# Patient Record
(demographics unavailable — no encounter records)

---

## 2024-11-26 NOTE — CONSULT LETTER
[Dear  ___] : Dear  [unfilled], [Consult Letter:] : I had the pleasure of evaluating your patient, [unfilled]. [Please see my note below.] : Please see my note below. [Consult Closing:] : Thank you very much for allowing me to participate in the care of this patient.  If you have any questions, please do not hesitate to contact me. [Sincerely,] : Sincerely, [FreeTextEntry3] : Zhanna Kenyon MD Department of Dermatology [facility] at [facility]

## 2024-11-26 NOTE — PHYSICAL EXAM
[Alert] : alert [Oriented x 3] : ~L oriented x 3 [Well Nourished] : well nourished [Conjunctiva Non-injected] : conjunctiva non-injected [No Visual Lymphadenopathy] : no visual  lymphadenopathy [No Clubbing] : no clubbing [No Edema] : no edema [No Bromhidrosis] : no bromhidrosis [No Chromhidrosis] : no chromhidrosis [FreeTextEntry3] : few comedones rare inflam papules on forehead cheeks chin no discrete patches of alopecia neg hair pull test pink patch and coalescing papules on anterior neck

## 2024-11-26 NOTE — HISTORY OF PRESENT ILLNESS
[FreeTextEntry1] : fp acne, hair loss [de-identified] : Referred by: Dr. Kevin Montes   Ms. AGUSTÍN WELCH  is a 13 year old F here for evaluation of below #asx rash on face neck x 2-3 days, improving slowly. tried benedryl w/o improvement . hc 2.5 cream helped a bit  #TE - interim hx: less shedding since  8/2024 hx: present x 3 mo  no illnesses/meds/surgeries  reports she lost 5 lb over unknown period of time per the PCP asx  #acne on face x months - improving w/ clinda/bpo/tret 0.025. not too dry.  hx: untreated, worse w/ menses which are regular   no personal or family h/o skin cancer

## 2024-11-26 NOTE — CONSULT LETTER
[Dear  ___] : Dear  [unfilled], [Consult Letter:] : I had the pleasure of evaluating your patient, [unfilled]. [Please see my note below.] : Please see my note below. [Consult Closing:] : Thank you very much for allowing me to participate in the care of this patient.  If you have any questions, please do not hesitate to contact me. [Sincerely,] : Sincerely, [FreeTextEntry3] : Zhanna Kenyon MD Department of Dermatology [facility] at [facility]

## 2024-11-26 NOTE — HISTORY OF PRESENT ILLNESS
[FreeTextEntry1] : fp acne, hair loss [de-identified] : Referred by: Dr. Kevin Montes   Ms. AGUSTÍN WELCH  is a 13 year old F here for evaluation of below #asx rash on face neck x 2-3 days, improving slowly. tried benedryl w/o improvement . hc 2.5 cream helped a bit  #TE - interim hx: less shedding since  8/2024 hx: present x 3 mo  no illnesses/meds/surgeries  reports she lost 5 lb over unknown period of time per the PCP asx  #acne on face x months - improving w/ clinda/bpo/tret 0.025. not too dry.  hx: untreated, worse w/ menses which are regular   no personal or family h/o skin cancer

## 2024-11-26 NOTE — ASSESSMENT
[Use of independent historian: [ enter independent historian's relationship to patient ] :____] : As the patient was unable to provide a complete and reliable history, I obtained clinical history from the patient's [unfilled] [FreeTextEntry1] : TE unclear trigger improving cbc, cmp, iron studies, TSH, vit D from 8/2024 unremarkable no treatment indicated at this time reassured about favorable prognosis should continue to improve. call if any further worsening  #AV, improved but still active/flaring, not at treatment goal discussed nature, chronicity and unpredictable course Education and anticipatory guidance provided c/w BPO 5% or less in shower daily to AA on face, back, leave on 5 min before rinsing. may bleach clothes/towels so use white towel if possible c/w clindamycin lotion/gel/solution qAM increase tretinoin to 0.05% qHS, apply pea sized amount to entire face. top with oil free, non comedogenic moisturizer such as Cerave PM Acne counseling and handout provided RTC 3 mo  #eczematous dermatitis, neck and face improving with HC 2.5% with minimal residual rash on neck - c/w hydrocortisone 2.5% ointment BID to AA of rough skin on face PRN roughness, SED

## 2025-03-17 NOTE — ASSESSMENT
[Use of independent historian: [ enter independent historian's relationship to patient ] :____] : As the patient was unable to provide a complete and reliable history, I obtained clinical history from the patient's [unfilled] [FreeTextEntry1] : #TE unclear trigger improving cbc, cmp, iron studies, TSH, vit D from 8/2024 unremarkable - no treatment indicated at this time - reassured about favorable prognosis - should continue to improve. call if any further worsening  #Acne - chronic; flaring. Overall improved but still active/flaring, not at treatment goal - Diagnosis, chronic nature, disease course, treatment options and goals of therapy discussed - restart BPO 5% or less in shower daily to AA on face, back, leave on 5 min before rinsing. may bleach clothes/towels so use white towel if possible - continue clindamycin lotion/gel/solution qAM - increase tretinoin to 0.1% qHS, apply pea sized amount to entire face. top with oil free, non comedogenic moisturizer such as Cerave PM - start doxycycline 100mg BID PO daily with food x 6 weeks; SED discussed including GI distress, photosensitivity, hypersensitivity rxns  RTC 4 months

## 2025-03-17 NOTE — HISTORY OF PRESENT ILLNESS
[FreeTextEntry1] : fp acne, hair loss [de-identified] : Referred by: Dr. Kevin Montes Ms. AGUSTÍN WELCH  is a 13 year old F here for f/u of:  #TE - interim hx: less shedding since LV  - no clear triggers. improved since LV but then started to increase in last week. asx  #acne on face x months - improving w/ clinda/tret 0.05. not too dry.    No personal or family h/o skin cancer Here with mom

## 2025-03-17 NOTE — PHYSICAL EXAM
[Alert] : alert [Oriented x 3] : ~L oriented x 3 [Well Nourished] : well nourished [Conjunctiva Non-injected] : conjunctiva non-injected [No Visual Lymphadenopathy] : no visual  lymphadenopathy [No Clubbing] : no clubbing [No Edema] : no edema [No Bromhidrosis] : no bromhidrosis [No Chromhidrosis] : no chromhidrosis [Declined] : declined [FreeTextEntry3] : few comedones rare inflam papules on forehead cheeks chin no discrete patches of alopecia neg hair pull test

## 2025-03-17 NOTE — HISTORY OF PRESENT ILLNESS
[FreeTextEntry1] : fp acne, hair loss [de-identified] : Referred by: Dr. Kevin Montes Ms. AGUSTÍN WELCH  is a 13 year old F here for f/u of:  #TE - interim hx: less shedding since LV  - no clear triggers. improved since LV but then started to increase in last week. asx  #acne on face x months - improving w/ clinda/tret 0.05. not too dry.    No personal or family h/o skin cancer Here with mom

## 2025-07-14 NOTE — HISTORY OF PRESENT ILLNESS
[FreeTextEntry1] : RPV - acne [de-identified] : Referred by: Dr. Kevin Montes Ms. AGUSTÍN WELCH is a 14 year old F here for f/u of:  #Acne - Last seen 3/17/25. Since LV, finished course of doxycycline. Tolerated it well without side effects. Usine BPO wash, tretinoin 0.1% cream 2x/week, clindamycin lotion daily.  - Feels like her acne is improving, however still getting whiteheads.  - Flares when she has her period   No personal or family h/o skin cancer. Here with her mom.

## 2025-07-14 NOTE — PHYSICAL EXAM
[Alert] : alert [Oriented x 3] : ~L oriented x 3 [Well Nourished] : well nourished [Conjunctiva Non-injected] : conjunctiva non-injected [No Visual Lymphadenopathy] : no visual  lymphadenopathy [No Clubbing] : no clubbing [No Edema] : no edema [No Bromhidrosis] : no bromhidrosis [No Chromhidrosis] : no chromhidrosis [Declined] : declined [FreeTextEntry3] : - Closed comedones scattered throughout bilateral cheeks - Few inflammatory papules on forehead, cheeks, chin - Scattered, faint hypopigmented patches on the face

## 2025-07-14 NOTE — ASSESSMENT
[Use of independent historian: [ enter independent historian's relationship to patient ] :____] : As the patient was unable to provide a complete and reliable history, I obtained clinical history from the patient's [unfilled] [FreeTextEntry1] : #Acne - chronic, improved but not at treatment goal - Diagnosis, chronic nature, disease course, treatment options and goals of therapy discussed - Continue BPO 5% or less in shower daily to AA on face, back, leave on 5 min before rinsing. May bleach clothes/towels so use white towel if possible. - Continue clindamycin lotion/gel/solution qAM - INCREASE tretinoin 0.1% from 2x/week to nightly, apply pea sized amount to entire face. Discussed moisturizing before and after application with oil free, non-comedogenic moisturizer such as Cerave PM  #Pityriasis alba - chronic, stable Not bothersome or itchy to the patient. - Discussed treatment options, patient and mom opt to defer treatment.  - Discussed importance of photoprotection. Recommended sunscreen daily.   RTC 6 months